# Patient Record
Sex: MALE | Race: WHITE | Employment: OTHER | URBAN - METROPOLITAN AREA
[De-identification: names, ages, dates, MRNs, and addresses within clinical notes are randomized per-mention and may not be internally consistent; named-entity substitution may affect disease eponyms.]

---

## 2017-08-15 ENCOUNTER — OFFICE VISIT (OUTPATIENT)
Dept: HEMATOLOGY | Age: 77
End: 2017-08-15

## 2017-08-15 ENCOUNTER — HOSPITAL ENCOUNTER (OUTPATIENT)
Dept: LAB | Age: 77
Discharge: HOME | End: 2017-08-15
Payer: MEDICARE

## 2017-08-15 VITALS
WEIGHT: 191 LBS | DIASTOLIC BLOOD PRESSURE: 68 MMHG | SYSTOLIC BLOOD PRESSURE: 126 MMHG | BODY MASS INDEX: 27.35 KG/M2 | OXYGEN SATURATION: 97 % | HEART RATE: 80 BPM | RESPIRATION RATE: 16 BRPM | HEIGHT: 70 IN | TEMPERATURE: 95.7 F

## 2017-08-15 DIAGNOSIS — C22.0 HEPATOCELLULAR CARCINOMA (HCC): Primary | ICD-10-CM

## 2017-08-15 DIAGNOSIS — C22.0 HEPATOCELLULAR CARCINOMA (HCC): ICD-10-CM

## 2017-08-15 PROBLEM — K74.60 CIRRHOSIS OF LIVER WITHOUT ASCITES (HCC): Status: ACTIVE | Noted: 2017-08-15

## 2017-08-15 PROBLEM — J45.909 ASTHMA: Status: ACTIVE | Noted: 2017-08-15

## 2017-08-15 PROBLEM — I10 HYPERTENSION: Status: ACTIVE | Noted: 2017-08-15

## 2017-08-15 PROBLEM — N40.0 BPH (BENIGN PROSTATIC HYPERTROPHY): Status: ACTIVE | Noted: 2017-08-15

## 2017-08-15 LAB
ALBUMIN SERPL BCP-MCNC: 3.6 G/DL (ref 3.4–5)
ALBUMIN/GLOB SERPL: 1.2 {RATIO} (ref 0.8–1.7)
ALP SERPL-CCNC: 52 U/L (ref 45–117)
ALT SERPL-CCNC: 23 U/L (ref 16–61)
ANION GAP BLD CALC-SCNC: 8 MMOL/L (ref 3–18)
AST SERPL W P-5'-P-CCNC: 19 U/L (ref 15–37)
BASOPHILS # BLD: 0 K/UL (ref 0–0.06)
BASOPHILS NFR BLD: 0 % (ref 0–2)
BILIRUB DIRECT SERPL-MCNC: 0.1 MG/DL (ref 0–0.2)
BILIRUB SERPL-MCNC: 0.5 MG/DL (ref 0.2–1)
BUN SERPL-MCNC: 19 MG/DL (ref 7–18)
BUN/CREAT SERPL: 20 (ref 12–20)
CALCIUM SERPL-MCNC: 8.8 MG/DL (ref 8.5–10.1)
CHLORIDE SERPL-SCNC: 110 MMOL/L (ref 100–108)
CO2 SERPL-SCNC: 30 MMOL/L (ref 21–32)
CREAT SERPL-MCNC: 0.95 MG/DL (ref 0.6–1.3)
DIFFERENTIAL METHOD BLD: ABNORMAL
EOSINOPHIL # BLD: 0.1 K/UL (ref 0–0.4)
EOSINOPHIL NFR BLD: 2 % (ref 0–5)
ERYTHROCYTE [DISTWIDTH] IN BLOOD BY AUTOMATED COUNT: 13.4 % (ref 11.6–14.5)
FERRITIN SERPL-MCNC: 64 NG/ML (ref 8–388)
GLOBULIN SER CALC-MCNC: 2.9 G/DL (ref 2–4)
GLUCOSE SERPL-MCNC: 81 MG/DL (ref 74–99)
HCT VFR BLD AUTO: 42.1 % (ref 36–48)
HGB BLD-MCNC: 13.6 G/DL (ref 13–16)
IRON SATN MFR SERPL: 47 %
IRON SERPL-MCNC: 96 UG/DL (ref 50–175)
LYMPHOCYTES # BLD: 1.1 K/UL (ref 0.9–3.6)
LYMPHOCYTES NFR BLD: 20 % (ref 21–52)
MCH RBC QN AUTO: 31.6 PG (ref 24–34)
MCHC RBC AUTO-ENTMCNC: 32.3 G/DL (ref 31–37)
MCV RBC AUTO: 97.7 FL (ref 74–97)
MONOCYTES # BLD: 0.4 K/UL (ref 0.05–1.2)
MONOCYTES NFR BLD: 7 % (ref 3–10)
NEUTS SEG # BLD: 4 K/UL (ref 1.8–8)
NEUTS SEG NFR BLD: 71 % (ref 40–73)
PLATELET # BLD AUTO: 175 K/UL (ref 135–420)
PMV BLD AUTO: 10.4 FL (ref 9.2–11.8)
POTASSIUM SERPL-SCNC: 4.4 MMOL/L (ref 3.5–5.5)
PROT SERPL-MCNC: 6.5 G/DL (ref 6.4–8.2)
RBC # BLD AUTO: 4.31 M/UL (ref 4.7–5.5)
SODIUM SERPL-SCNC: 148 MMOL/L (ref 136–145)
TIBC SERPL-MCNC: 204 UG/DL (ref 250–450)
WBC # BLD AUTO: 5.7 K/UL (ref 4.6–13.2)

## 2017-08-15 PROCEDURE — 83540 ASSAY OF IRON: CPT | Performed by: INTERNAL MEDICINE

## 2017-08-15 PROCEDURE — 82103 ALPHA-1-ANTITRYPSIN TOTAL: CPT | Performed by: INTERNAL MEDICINE

## 2017-08-15 PROCEDURE — 85025 COMPLETE CBC W/AUTO DIFF WBC: CPT | Performed by: INTERNAL MEDICINE

## 2017-08-15 PROCEDURE — 82728 ASSAY OF FERRITIN: CPT | Performed by: INTERNAL MEDICINE

## 2017-08-15 PROCEDURE — 36415 COLL VENOUS BLD VENIPUNCTURE: CPT | Performed by: INTERNAL MEDICINE

## 2017-08-15 PROCEDURE — 80048 BASIC METABOLIC PNL TOTAL CA: CPT | Performed by: INTERNAL MEDICINE

## 2017-08-15 PROCEDURE — 80076 HEPATIC FUNCTION PANEL: CPT | Performed by: INTERNAL MEDICINE

## 2017-08-15 NOTE — MR AVS SNAPSHOT
Visit Information Date & Time Provider Department Dept. Phone Encounter #  
 8/15/2017  9:00 AM Robe Benjamin MD [facility] [address] of  [address] [id number] Follow-up Instructions Return in about 4 weeks (around 9/12/2017) for MLS. Upcoming Health Maintenance Date Due DTaP/Tdap/Td series (1 - Tdap) 1/23/1964 FOBT Q 1 YEAR AGE 50-75 1/23/1993 ZOSTER VACCINE AGE 60> 11/23/2002 GLAUCOMA SCREENING Q2Y 1/23/2008 Pneumococcal 65+ Low/Medium Risk (1 of 2 - PCV13) 1/23/2008 MEDICARE YEARLY EXAM 1/23/2008 INFLUENZA AGE 9 TO ADULT 8/1/2017 Allergies as of 8/15/2017  Review Complete On: 8/15/2017 By: Robe Benjamin MD  
  
 Severity Noted Reaction Type Reactions Celebrex [Celecoxib]  08/15/2017    Rash Current Immunizations  Never Reviewed No immunizations on file. Not reviewed this visit You Were Diagnosed With   
  
 Codes Comments Hepatocellular carcinoma ([facility]    -  Primary ICD-10-CM: C22.0 ICD-9-CM: 155.0 Vitals BP Pulse Temp Resp Height(growth percentile) 126/68 (BP 1 Location: Left arm, BP Patient Position: Sitting) 80 95.7 °F (35.4 °C) (Tympanic) 16 5' 10\" (1.778 m) Weight(growth percentile) SpO2 BMI Smoking Status 191 lb (86.6 kg) 97% 27.41 kg/m2 Former Smoker Vitals History BMI and BSA Data Body Mass Index Body Surface Area  
 27.41 kg/m 2 2.07 m 2 Your Updated Medication List  
  
   
This list is accurate as of: 8/15/17 10:29 AM.  Always use your most recent med list.  
  
  
  
  
 Lindalou Bellow HFA IN Take  by inhalation. CARDURA PO Take  by mouth. GLUCOSAMINE COMPLEX PO Take  by mouth. MEN'S MULTI-VITAMIN PO Take  by mouth. REMERON PO Take  by mouth. XANAX PO Take  by mouth. XARELTO PO Take  by mouth. Follow-up Instructions Return in about 4 weeks (around 9/12/2017) for MLS. To-Do List   
 08/15/2017 Imaging:  US ABD LTD W ELASTOGRAPHY Introducing [facility]! [employer] introduces [email or web address] patient portal. Now you can access parts of your medical record, email your doctor's office, and request medication refills online. 1. In your internet browser, go to [email or web address] 2. Click on the First Time User? Click Here link in the Sign In box. You will see the New Member Sign Up page. 3. Enter your [email or web address] Access Code exactly as it appears below. You will not need to use this code after youve completed the sign-up process. If you do not sign up before the expiration date, you must request a new code. · [email or web address] Access Code: [email or web address] Expires: 11/8/2017  4:17 PM 
 
4. Enter the last four digits of your Social Security Number (xxxx) and Date of Birth (mm/dd/yyyy) as indicated and click Submit. You will be taken to the next sign-up page. 5. Create a [email or web address] ID. This will be your [email or web address] login ID and cannot be changed, so think of one that is secure and easy to remember. 6. Create a [email or web address] password. You can change your password at any time. 7. Enter your Password Reset Question and Answer. This can be used at a later time if you forget your password. 8. Enter your e-mail address. You will receive e-mail notification when new information is available in [address] 9. Click Sign Up. You can now view and download portions of your medical record. 10. Click the Download Summary menu link to download a portable copy of your medical information. If you have questions, please visit the Frequently Asked Questions section of the [email or web address] website. Remember, [email or web address] is NOT to be used for urgent needs. For medical emergencies, dial 911. Now available from your iPhone and Android! Please provide this summary of care documentation to your next provider. Your primary care clinician is listed as Wen Ortega.  If you have any questions after today's visit, please call [phone].

## 2017-08-15 NOTE — PROGRESS NOTES
[address] [place], [address], [place], [place]       POLLY Corrales PA-C Nino Kudo, NP Roselynn Lawn, NP        at [address]     [address] [place] [address], [address]     [phone]     FAX: [phone]    at [address], [address] [place] [address], [address]     [phone]     FAX: [phone]       PCP: Patient Care Team:  Venkatesh Cordero MD as PCP - General (Internal Medicine)  Sorin Rahman MD (Oncology)      Problem List  Date Reviewed: 8/15/2017          Codes Class Noted    Hepatocellular carcinoma ([address] ICD-10-CM: C22.0  ICD-9-CM: 155.0  8/15/2017        Cirrhosis of liver without ascites ([address] ICD-10-CM: K74.60  ICD-9-CM: 571.5  8/15/2017        Asthma ICD-10-CM: J45.909  ICD-9-CM: 493.90  8/15/2017        BPH (benign prostatic hypertrophy) ICD-10-CM: N40.0  ICD-9-CM: 600.00  8/15/2017        Hypertension ICD-10-CM: I10  ICD-9-CM: 401.9  8/15/2017                The physicians listed above have asked me to see [facility] in consultation regarding hepatocellular carcinoma and its management. All medical records sent by the referring physicians were reviewed including imaging studies and pathology. The patient is a 78y.o. year old  male without evidence of cirrhosis who was found to have a liver mass in 2013 that was suspicious for [address] Serologic studies for HBV and HCV were negative,.  IT is not clear if serologic studies for other causes of liver disease were done at that time. He underwent a partial right lobectomy by Dr Malik Frost at [facility] in 2013. He developed recurrence of [address] in 2015. He was seen at [facility] and underwent hepatic resection to remove 2 HCCs in 7/2015 and 8/2015. The location of the lesions are not know at this time. He developed another recurrence of [address] in 2016. He was enrolled in a clinival trial using Opdivo and another experimental agent at [facility]. He eventual failed that regimen and it was stopped when the lesion in the left lobe was noted to increase in size. He was seen at [facility] and underwent [address] 2 lesions in 2/2017. Either both were in the left lobe or one was in the left and the other in the right. MRI scan in 5/2017 did not demonstrate any recurrence or new enhancing hepatic lesions. He is scheduled for another MRI in 8/2017. The most recent imaging of the liver did suggest that the patient had cirrhosis. He notes that he was never previously been told he had cirrhosis and wants this clarified. The patient has not developed any complications of cirrhosis. The patient has not had been evaluated for varices. The most recent laboratory studies indicate that the liver transaminases are normal, ALP is normal, tests of hepatic synthetic and metabolic function are normal, and the platelet count is normal.    AFP has always been in the normal range. The patient has no symptoms which could be attributed to the liver disorder. The patient completes all daily activities without any functional limitations. The patient has not experienced fatigue, pain in the right side over the liver,       ALLERGIES  Allergies   Allergen Reactions    Celebrex [Celecoxib] Rash       MEDICATIONS  Current Outpatient Prescriptions   Medication Sig    FLUTICASONE/SALMETEROL (ADVAIR HFA IN) Take  by inhalation.  DOXAZOSIN MESYLATE (CARDURA PO) Take  by mouth.  ALPRAZOLAM (XANAX PO) Take  by mouth.  MIRTAZAPINE (REMERON PO) Take  by mouth.  GLUCOSAM/GLUC RINALDI/VN-TIM-I-GLUC (GLUCOSAMINE COMPLEX PO) Take  by mouth.  MEN'S MULTI-VITAMIN PO Take  by mouth.  RIVAROXABAN (XARELTO PO) Take  by mouth. No current facility-administered medications for this visit.         SYSTEM REVIEW NOT RELATED TO LIVER DISEASE OR REVIEWED ABOVE:  Constitution systems: Negative for fever, chills, weight gain, weight loss. Eyes: Negative for visual changes. ENT: Negative for sore throat, painful swallowing. Respiratory: Negative for cough, hemoptysis, SOB. Cardiology: Negative for chest pain, palpitations. GI:  Negative for constipation or diarrhea. : Negative for urinary frequency, dysuria, hematuria, nocturia. Skin: Negative for rash. Hematology: Negative for easy bruising, blood clots. Musculo-skelatal: Negative for back pain, muscle pain, weakness. Neurologic: Negative for headaches, dizziness, vertigo, memory problems not related to HE. Psychology: Negative for anxiety, depression. FAMILY HISTORY:  There is no family history of liver disease. SOCIAL HISTORY:  The patient is . The patient does not use tobacco products. The patient has previously consumed alcohol socially never in excess. The patient had exposure to agent orange and to radiation in the 1960-1970s. PHYSICAL EXAMINATION:  Visit Vitals    /68 (BP 1 Location: Left arm, BP Patient Position: Sitting)    Pulse 80    Temp 95.7 °F (35.4 °C) (Tympanic)    Resp 16    Ht 5' 10\" (1.778 m)    Wt 191 lb (86.6 kg)    SpO2 97%    BMI 27.41 kg/m2     General: No acute distress. Eyes: Sclera anicteric. ENT: No oral lesions. Thyroid normal.  Nodes: No adenopathy. Skin: No spider angiomata. No jaundice. No palmar erythema. Respiratory: Lungs clear to auscultation. Cardiovascular: Regular heart rate. No murmurs. No JVD. Abdomen: Well healed liver incision. Soft non-tender. Liver size normal to percussion/palpation. Spleen not palpable. No obvious ascites. Extremities: No edema. No muscle wasting. No gross arthritic changes. Neurologic: Alert and oriented. Cranial nerves grossly intact. No asterixis.     LABORATORY STUDIES:  [facility] of [address] [place] [address] & Units 8/15/2017   WBC 4.6 - 13.2 K/uL 5. 7   ANC 1.8 - 8.0 K/UL 4.0   HGB 13.0 - 16.0 g/dL 13.6    - 420 K/uL 175   AST 15 - 37 U/L 19   ALT 16 - 61 U/L 23   Alk Phos 45 - 117 U/L 52   Bili, Total 0.2 - 1.0 MG/DL 0.5   Bili, Direct 0.0 - 0.2 MG/DL 0.1   Albumin 3.4 - 5.0 g/dL 3.6   BUN 7.0 - 18 MG/DL 19 (H)   Creat 0.6 - 1.3 MG/DL 0.95   Na 136 - 145 mmol/L 148 (H)   K 3.5 - 5.5 mmol/L 4.4   Cl 100 - 108 mmol/L 110 (H)   CO2 21 - 32 mmol/L 30   Glucose 74 - 99 mg/dL 81     SEROLOGIES:  Not available or performed. Testing will be performed as indicated. LIVER HISTOLOGY:  Not available or performed    ENDOSCOPIC PROCEDURES:  Not available or performed    RADIOLOGY:  6/2015. CT scan chest, abdomen, pelvis. No pulmonary lesions. Several soft tissue enhancing lesions inferior to left lobe. New soft tissue mass adjacent to surgical incision. 12/2015. MRI liver. No enhancing lesions concerning for recurrent HCC.  6/2016. MRI liver. Increasing size of soft tissue lesions adjacent to inferior surface of left lobe. 5 mm left lobe enhancing lesions with washout. 9/2016. CT chest, abdomen. New 3 mm pulmonary nodule. No enhancing liver lesions. 12/2016. CT chest, abdomen. No new or enlarging pulmonary lesions. Increase in size of segment 2 enhancing lesion with washout. New 6mm enhancing lesion in segment 3. [address] TREATMENT:  2013. Partial right hepatectomy. 2015. Resection of soft tissue lesions adjacent to liver and in surgical incision. 2/2017. MWA of lesions in segment 2 and 3. OTHER TESTING:  Not available or performed    ASSESSMENT AND PLAN:  Hepatocellular carcinoma stage 3 at the time of diagnosis. Surgical resection of primary lesion in 2013. Surgical resection of [address] outside the lvier and in the surgical scar in 2015. MWA of lesions in segments 2 and 3 in 2/2017. Will order hepatic panel, BMP, INR, CBC with platelet count to assess liver function and recalculate MELD score.     The most recent laboratory studies indicate that the liver transaminases are normal, alkaline phosphatase is normal, tests of hepatic synthetic and metabolic function are normal, and the platelet count is normal.      Recent imaging of the liver suggesting cirrhosis. I do not think he has cirrhosis. I think the liver appears nodular and irregular in its margins becasue of 2 previous surgical resections. If the patient had cirrhosis at the time of the original presentation I do not think he would have tolerated the first right hepatectomy and definitely not tolerated a second liver surgery. Will perform imaging of the liver with Ultrasound. The need to assess liver fibrosis was discussed. Sheer wave elastography can assess liver fibrosis and determine if a patient has advanced fibrosis or cirrhosis without the need for liver biopsy. Sheer wave elastography is now available at [facility]. This will be scheduled. Serologic testing for causes of chronic liver disease were ordered. At this point I do not suspect her has any underlying liver disease. There is no indication to perform EGD and evaluate for Esophageal varices unless the elastography suggests he has significant fibrosis/cirrhosis. All of the above issues were discussed with the patient. All questions were answered. The patient expressed a clear understanding of the above. [facility] [place] [address] in 4 weeks to review data and development a treatment plan.     Melody Jackson MD  Liver [place] [facility] [address], [address] [place] [address], [address] [place] [address] Street [address]  [phone]

## 2017-08-16 LAB — A1AT SERPL-MCNC: 168 MG/DL (ref 90–200)

## 2017-09-18 ENCOUNTER — HOSPITAL ENCOUNTER (OUTPATIENT)
Dept: ULTRASOUND IMAGING | Age: 77
Discharge: HOME | End: 2017-09-18
Attending: INTERNAL MEDICINE
Payer: MEDICARE

## 2017-09-18 DIAGNOSIS — C22.0 HEPATOCELLULAR CARCINOMA (HCC): ICD-10-CM

## 2017-09-18 PROCEDURE — 0346T US ABD LTD W ELASTOGRAPHY: CPT

## 2017-09-21 ENCOUNTER — TELEPHONE (OUTPATIENT)
Dept: HEMATOLOGY | Age: 77
End: 2017-09-21

## 2017-11-01 ENCOUNTER — TELEPHONE (OUTPATIENT)
Dept: HEMATOLOGY | Age: 77
End: 2017-11-01

## 2021-08-30 ENCOUNTER — OFFICE VISIT (OUTPATIENT)
Dept: HEMATOLOGY | Age: 81
End: 2021-08-30
Payer: MEDICARE

## 2021-08-30 VITALS
BODY MASS INDEX: 25.62 KG/M2 | OXYGEN SATURATION: 98 % | HEIGHT: 70 IN | DIASTOLIC BLOOD PRESSURE: 67 MMHG | HEART RATE: 80 BPM | TEMPERATURE: 97.8 F | WEIGHT: 179 LBS | SYSTOLIC BLOOD PRESSURE: 139 MMHG

## 2021-08-30 DIAGNOSIS — C22.0 HEPATOCELLULAR CARCINOMA (HCC): Primary | ICD-10-CM

## 2021-08-30 PROBLEM — D64.9 ANEMIA: Status: ACTIVE | Noted: 2021-08-30

## 2021-08-30 PROBLEM — D69.6 THROMBOCYTOPENIA (HCC): Status: ACTIVE | Noted: 2021-08-30

## 2021-08-30 PROBLEM — Z77.098 AGENT ORANGE EXPOSURE: Status: ACTIVE | Noted: 2021-08-30

## 2021-08-30 PROBLEM — K74.60 CIRRHOSIS OF LIVER WITHOUT ASCITES (HCC): Status: RESOLVED | Noted: 2017-08-15 | Resolved: 2021-08-30

## 2021-08-30 PROCEDURE — G0463 HOSPITAL OUTPT CLINIC VISIT: HCPCS | Performed by: INTERNAL MEDICINE

## 2021-08-30 PROCEDURE — 1101F PT FALLS ASSESS-DOCD LE1/YR: CPT | Performed by: INTERNAL MEDICINE

## 2021-08-30 PROCEDURE — G8754 DIAS BP LESS 90: HCPCS | Performed by: INTERNAL MEDICINE

## 2021-08-30 PROCEDURE — G8536 NO DOC ELDER MAL SCRN: HCPCS | Performed by: INTERNAL MEDICINE

## 2021-08-30 PROCEDURE — 99205 OFFICE O/P NEW HI 60 MIN: CPT | Performed by: INTERNAL MEDICINE

## 2021-08-30 PROCEDURE — G8427 DOCREV CUR MEDS BY ELIG CLIN: HCPCS | Performed by: INTERNAL MEDICINE

## 2021-08-30 PROCEDURE — G8752 SYS BP LESS 140: HCPCS | Performed by: INTERNAL MEDICINE

## 2021-08-30 PROCEDURE — G8419 CALC BMI OUT NRM PARAM NOF/U: HCPCS | Performed by: INTERNAL MEDICINE

## 2021-08-30 PROCEDURE — G8510 SCR DEP NEG, NO PLAN REQD: HCPCS | Performed by: INTERNAL MEDICINE

## 2021-08-30 RX ORDER — OMEPRAZOLE 20 MG/1
20 CAPSULE, DELAYED RELEASE ORAL DAILY
COMMUNITY

## 2021-08-30 RX ORDER — SUCRALFATE 1 G/1
1 TABLET ORAL 4 TIMES DAILY
COMMUNITY

## 2021-08-30 NOTE — PROGRESS NOTES
[address] [facility]      Ro Hannon MD, John Rodriguez, Robert Rivera MD, MPH      Mejia Fitzgerald, NATALIA Beard, [facility]     Lety Goyal, [facility]   Lori Boyd P-DELMI Maria, [facility]       Frederic Bruno [facility]    at [address], [address] [place] [address]    [address], located within [facility] 22.    [phone]    FAX: [address] [place] [address], [address]    [phone]    FAX: [phone]       Patient Care Team:  Masha Lora MD as PCP - General (Internal Medicine)  Izabel Lindquist MD (Oncology)  Izabel Lindquist MD as Referring Provider (Oncology)      Problem List  Date Reviewed: 8/30/2021        Codes Class Noted    Pulmonary embolism [facility]) ICD-10-CM: I26.99  ICD-9-CM: 415.19  9/12/2021        Anemia ICD-10-CM: D64.9  ICD-9-CM: 285.9  8/30/2021        Thrombocytopenia ([facility] ICD-10-CM: D69.6  ICD-9-CM: 287.5  8/30/2021        Agent orange exposure ICD-10-CM: A70.470  ICD-9-CM: V87.2  8/30/2021        Hepatocellular carcinoma ([facility] ICD-10-CM: C22.0  ICD-9-CM: 155.0  8/15/2017        Asthma ICD-10-CM: J45.909  ICD-9-CM: 493.90  8/15/2017        BPH (benign prostatic hypertrophy) ICD-10-CM: N40.0  ICD-9-CM: 600.00  8/15/2017        Hypertension ICD-10-CM: I10  ICD-9-CM: 401.9  8/15/2017              The clinicians listed above have asked me to see Paz Quigley in consultation regarding hepatocellular carcinoma and its management. All medical records sent by the referring physicians were reviewed including imaging studies and pathology. The patient is a 80y.o. year old  male without evidence of cirrhosis. He had a pulmonary embolus in 9/2013 and imaging demonstrated a liver mass.       He was found to have hepatocellular carcinoma in 11/2013. A needle biopsy of the liver mass in 11/2013 was positive for [address]    HCC was treated with TACE in 12/2013. He went to [facility] and underwent cholecystectomy and extended right hepatectomy including segments 5,6,7,8,1 and 4 in 1/2014. Pathology of the 5.1 cm mass was well differentiated HCC. He had local recurrence of [address] in 8/2015. He entered a clinical trial at [facility] and was treated with Nivolumab (Opdivo) another agent, probably Ipilimumab (Harshad Sunray) in 8/2015. Unclear when clinical trial stopped but I suspect this continued until 12/2016 - 1/2017. He underwent Radiotherapy, SBRT or SRS starting at [facility] in 2/2017. The most recent imaging of the liver was MRI performed in 7/2021. Results demonstrate previous right hepatectomy. Slight decrease in size of arterial enhancing lesion in left lobe now measuring 1.4 cm. Slight decrease in size of hyperenhancing focus at resection margin, now measuring 1.0 cm. Anterior left lobe lesion measuring 2.7 cm has no enhancement and likely not HCC. Several liver cysts. The patient has the following symptoms which could be attributed to the liver disorder:    fatigue,     The patient is not experiencing the following symptoms which are commonly seen in this liver disorder:   nausea,   vomiting,   swelling of the abdomen,   swelling of the lower extremities,     The patient completes all daily activities without any functional limitations. ASSESSMENT AND PLAN:  Hepatocellular carcinoma   The diagnosis was made in 11/2013 by biopsy. At diagnosis the [address] was of unclear size. Location appeared to be right lobe. The patient does not have cirrhosis    HCC has been treated with TACE, surgical resection, immune therapy and RFA and SRS. He has never been treated with a TKI. The most recent imaging was a Dynamic MRI performed in 7/2021.   This demonstrated 2 small areas of residual enhancement in left lobe measuring 1.4 cm and at resection margin in right lobe measuring 1.0 cm. Will schedule for repeat dynamic MRI in 10/2021     Will perform fibroscan to assess for liver injury at the next appointment in 1 month. Given the previous liver resection may not be able to get a reading. If this occurrs will do US elastography. Pulmonary Embolus  This occurred in 9/2013. He is on anticoagulation with Roma Adry. Anemia   This was due to FE deficiency. He was treated with IV FE. Anemia has resolved. The most recent HB is 13.6 gm. Treatment of other medical problems   There are no contraindications for the patient to take most medications that are necessary for treatment of other medical issues. Counseling for alcohol in patients with chronic liver disease  The patient was counseled regarding alcohol consumption and the effect of alcohol on chronic liver disease. The patient has not consumed alcohol since 11/2013. Vaccinations   The patient has received 2 doses of COVID-19 vaccine. Routine vaccinations against other bacterial and viral agents can be performed as indicated. Annual flu vaccination should be administered if indicated. ALLERGIES  Allergies   Allergen Reactions    Celebrex [Celecoxib] Rash       MEDICATIONS  Current Outpatient Medications   Medication Sig    sucralfate (Carafate) 1 gram tablet Take 1 g by mouth four (4) times daily.  omeprazole (PRILOSEC) 20 mg capsule Take 20 mg by mouth daily.  FLUTICASONE/SALMETEROL (ADVAIR HFA IN) Take  by inhalation.  DOXAZOSIN MESYLATE (CARDURA PO) Take  by mouth.  ALPRAZOLAM (XANAX PO) Take  by mouth.  MIRTAZAPINE (REMERON PO) Take  by mouth.  MEN'S MULTI-VITAMIN PO Take  by mouth.  RIVAROXABAN (XARELTO PO) Take  by mouth.  GLUCOSAM/GLUC RINALDI/RD-PVZ-I-GLUC (GLUCOSAMINE COMPLEX PO) Take  by mouth. (Patient not taking: Reported on 8/30/2021)     No current facility-administered medications for this visit.        SYSTEM REVIEW NOT RELATED TO LIVER DISEASE OR REVIEWED ABOVE:  Constitution systems: Negative for fever, chills, weight gain, weight loss. Eyes: Negative for visual changes. ENT: Negative for sore throat, painful swallowing. Respiratory: Negative for cough, hemoptysis, SOB. Cardiology: Negative for chest pain, palpitations. GI:  Negative for constipation or diarrhea. : Negative for urinary frequency, dysuria, hematuria, nocturia. Skin: Negative for rash. Hematology: Negative for easy bruising, blood clots. Musculo-skelatal: Negative for back pain, muscle pain, weakness. Neurologic: Negative for headaches, dizziness, vertigo, memory problems not related to HE. Psychology: Negative for anxiety, depression. FAMILY HISTORY:  The father  of lung cancer. The mother  of mesothelioma. There is no family history of liver disease. SOCIAL HISTORY:  The patient is . The patient has 2 children, and 4 grandchildren. The patient stopped using tobacco products in . The patient consumes 2 scotch per night. The patient has been abstinent from alcohol since 2013. The patient retired as a [employer]. PHYSICAL EXAMINATION:  Visit Vitals  /67   Pulse 80   Temp 97.8 °F (36.6 °C) (Tympanic)   Ht 5' 10\" (1.778 m)   Wt 179 lb (81.2 kg)   SpO2 98%   BMI 25.68 kg/m²       General: No acute distress. Eyes: Sclera anicteric. ENT: No oral lesions. Thyroid normal.  Nodes: No adenopathy. Skin: No spider angiomata. No jaundice. No palmar erythema. Respiratory: Lungs clear to auscultation. Cardiovascular: Regular heart rate. No murmurs. No JVD. Abdomen: Soft non-tender, liver size normal to percussion/palpation. Spleen not palpable. No obvious ascites. Extremities: No edema. No muscle wasting. No gross arthritic changes. Neurologic: Alert and oriented. Cranial nerves grossly intact. No asterixis.       PHYSICAL EXAMINATION:  Visit Vitals  /67   Pulse 80   Temp 97.8 °F (36.6 °C) (Tympanic)   Ht 5' 10\" (1.778 m)   Wt 179 lb (81.2 kg)   SpO2 98%   BMI 25.68 kg/m²     General: No acute distress. Eyes: Sclera anicteric. ENT: No oral lesions. Thyroid normal.  Nodes: No adenopathy. Skin: No spider angiomata. No jaundice. No palmar erythema. Respiratory: Lungs clear to auscultation. Cardiovascular: Regular heart rate. No murmurs. No JVD. Abdomen: Well healed surgical scars. Extremities: No edema. No muscle wasting. No gross arthritic changes. Neurologic: Alert and oriented. Cranial nerves grossly intact. No asterixis. LABORATORY STUDIES:  Liver [place] of [address] [place] [address] 8/15/2017   WBC 4.6 - 13.2 K/uL 5.7   ANC 1.8 - 8.0 K/UL 4.0   HGB 13.0 - 16.0 g/dL 13.6    - 420 K/uL 175   AST 15 - 37 U/L 19   ALT 16 - 61 U/L 23   Alk Phos 45 - 117 U/L 52   Bili, Total 0.2 - 1.0 MG/DL 0.5   Bili, Direct 0.0 - 0.2 MG/DL 0.1   Albumin 3.4 - 5.0 g/dL 3.6   BUN 7.0 - 18 MG/DL 19 (H)   Creat 0.6 - 1.3 MG/DL 0.95   Na 136 - 145 mmol/L 148 (H)   K 3.5 - 5.5 mmol/L 4.4   Cl 100 - 108 mmol/L 110 (H)   CO2 21 - 32 mmol/L 30   Glucose 74 - 99 mg/dL 81     SEROLOGIES:  7/2016. HAV total negative, HBsAntigen negative, anti-HBcore negative, anti-HBsurface negative, anti-HCV negative, HCV RNA undetectable. Serologies Latest Ref Rng & Units 8/15/2017   Ferritin 8 - 388 NG/ML 64   Iron % Saturation % 47   Alpha-1 antitrypsin level 90 - 200 mg/dL 168     LIVER HISTOLOGY:  Not available or performed    ENDOSCOPIC PROCEDURES:  Not available or performed    RADIOLOGY:  See above    OTHER TESTING:  Not available or performed    FOLLOW-UP:  All of the issues listed above in the Assessment and Plan were discussed with the patient. All questions were answered. The patient expressed a clear understanding of the above. [facility] 87 in 4 weeks for Fibroscan to review all data and determine the treatment plan.       Ivania Padilla MD  [facility] [address] [facility]  [address] [facility] [address], [address]  [address], [address]  12 [facility]

## 2021-08-30 NOTE — Clinical Note
9/12/2021    Patient: Tete Bingham   YOB: 1940   Date of Visit: 8/30/2021     Gurdeep Uribe MD  [address] [address]  Via Fax: [phone]    Dear Gurdeep Uribe MD,      Thank you for referring Mr. Tete Bingham to [address] [place] [address],[address] for evaluation. My notes for this consultation are attached. If you have questions, please do not hesitate to call me. I look forward to following your patient along with you.       Sincerely,    Lavinia Woods MD

## 2021-09-12 PROBLEM — I26.99 PULMONARY EMBOLISM (HCC): Status: ACTIVE | Noted: 2021-09-12

## 2021-10-18 ENCOUNTER — OFFICE VISIT (OUTPATIENT)
Dept: HEMATOLOGY | Age: 81
End: 2021-10-18
Payer: MEDICARE

## 2021-10-18 VITALS
SYSTOLIC BLOOD PRESSURE: 143 MMHG | HEART RATE: 70 BPM | BODY MASS INDEX: 27.32 KG/M2 | WEIGHT: 190.38 LBS | OXYGEN SATURATION: 98 % | TEMPERATURE: 96.3 F | DIASTOLIC BLOOD PRESSURE: 71 MMHG

## 2021-10-18 DIAGNOSIS — C22.0 HEPATOCELLULAR CARCINOMA (HCC): Primary | ICD-10-CM

## 2021-10-18 PROCEDURE — G0463 HOSPITAL OUTPT CLINIC VISIT: HCPCS | Performed by: INTERNAL MEDICINE

## 2021-10-18 PROCEDURE — G8419 CALC BMI OUT NRM PARAM NOF/U: HCPCS | Performed by: INTERNAL MEDICINE

## 2021-10-18 PROCEDURE — 1101F PT FALLS ASSESS-DOCD LE1/YR: CPT | Performed by: INTERNAL MEDICINE

## 2021-10-18 PROCEDURE — G8536 NO DOC ELDER MAL SCRN: HCPCS | Performed by: INTERNAL MEDICINE

## 2021-10-18 PROCEDURE — G8754 DIAS BP LESS 90: HCPCS | Performed by: INTERNAL MEDICINE

## 2021-10-18 PROCEDURE — G8432 DEP SCR NOT DOC, RNG: HCPCS | Performed by: INTERNAL MEDICINE

## 2021-10-18 PROCEDURE — 91200 LIVER ELASTOGRAPHY: CPT | Performed by: INTERNAL MEDICINE

## 2021-10-18 PROCEDURE — G8753 SYS BP > OR = 140: HCPCS | Performed by: INTERNAL MEDICINE

## 2021-10-18 PROCEDURE — 99214 OFFICE O/P EST MOD 30 MIN: CPT | Performed by: INTERNAL MEDICINE

## 2021-10-18 PROCEDURE — G8427 DOCREV CUR MEDS BY ELIG CLIN: HCPCS | Performed by: INTERNAL MEDICINE

## 2021-10-18 NOTE — Clinical Note
10/30/2021    Patient: Gladys Vazquez   YOB: 1940   Date of Visit: 10/18/2021     Jaspreet Ramirez MD  [facility] [address] [place] [id number]  Via Fax: [phone]    Dear Jaspreet Ramirez MD,      Thank you for referring Mr. Gladys Vazquez to [address] [place] [address],[address] for evaluation. My notes for this consultation are attached. If you have questions, please do not hesitate to call me. I look forward to following your patient along with you.       Sincerely,    Edith Peabody, MD

## 2021-10-18 NOTE — PROGRESS NOTES
[address] [facility]      Trevor Anaya MD, Konrad Kang MD, MPH      Brit Evangelista, PA-DELMI Morales, ACNP-BC     Lety Goyal, [facility]   Pedro Vasquez, FNP-C    Shireen García, [facility]       Frederic Bruno [address]    at [address], [address] [place] [address]    [address] [facility] 22.    [phone]    FAX: [address] [place] [address], [address]    [phone]    [address] [place] [address] Street: [phone]       Patient Care Team:  Guillermo Hamm MD as PCP - General (Internal Medicine)  Laura Sams MD (Oncology)  Laura Sams MD as Referring Provider (Oncology)      Problem List  Date Reviewed: 8/30/2021        Codes Class Noted    Pulmonary embolism [facility]) ICD-10-CM: I26.99  ICD-9-CM: 415.19  9/12/2021        Anemia ICD-10-CM: D64.9  ICD-9-CM: 285.9  8/30/2021        Thrombocytopenia ([facility].) ICD-10-CM: D69.6  ICD-9-CM: 287.5  8/30/2021        Agent orange exposure ICD-10-CM: R97.115  ICD-9-CM: V87.2  8/30/2021        Hepatocellular carcinoma ([facility] ICD-10-CM: C22.0  ICD-9-CM: 155.0  8/15/2017        Asthma ICD-10-CM: J45.909  ICD-9-CM: 493.90  8/15/2017        BPH (benign prostatic hypertrophy) ICD-10-CM: N40.0  ICD-9-CM: 600.00  8/15/2017        Hypertension ICD-10-CM: I10  ICD-9-CM: 401.9  8/15/2017              Young Chinchilla is being seen at [facility] for management of hepatocellular carcinoma. The active problem list, all pertinent past medical history, medications, radiologic findings and laboratory findings related to the liver disorder were reviewed and discussed with the patient. The patient is a 80y.o. year old  male without evidence of cirrhosis.       He had a pulmonary embolus in 9/2013 and imaging demonstrated a liver mass.      He was found to have hepatocellular carcinoma in 11/2013. A needle biopsy of the liver mass in 11/2013 was positive for [address]    HCC was treated with TACE in 12/2013. He went to [facility] and underwent cholecystectomy and extended right hepatectomy including segments 5,6,7,8,1 and 4 in 1/2014. Pathology of the 5.1 cm mass was well differentiated HCC. He had local recurrence of [address] in 8/2015. He entered a clinical trial at [facility] and was treated with Nivolumab (Opdivo) another agent, probably Ipilimumab (Carol Presser) in 8/2015. Unclear when clinical trial stopped but I suspect this continued until 12/2016 - 1/2017. He underwent Radiotherapy, MWA and the Y-90 at [facility] in 2/2017. He has received steriotactic XR at [facility] 4/2020. The most recent imaging of the liver was MRI performed in 7/2021. Results demonstrate previous right hepatectomy. Slight decrease in size of arterial enhancing lesion in left lobe now measuring 1.4 cm. Slight decrease in size of hyperenhancing focus at resection margin, now measuring 1.0 cm. Anterior left lobe lesion measuring 2.7 cm has no enhancement and likely not HCC. Several liver cysts. Assessment of liver fibrosis with Fibroscan was performed in the office today. The result was 6.8 kPa which correlates with   stage 1 portal fibrosis    The patient has the following symptoms which could be attributed to the liver disorder:    fatigue,     The patient is not experiencing the following symptoms which are commonly seen in this liver disorder:   nausea,   vomiting,   swelling of the abdomen,   swelling of the lower extremities,     The patient completes all daily activities without any functional limitations. Next MRI 10/25. ASSESSMENT AND PLAN:  Hepatocellular carcinoma   The diagnosis was made in 11/2013 by biopsy. At diagnosis the [address] was of unclear size. Location appeared to be right lobe.       The patient does not have cirrhosis  Fibroscan in 10/2021 demonstrated  6.8 kPa and  suggesting mild stage 1 fibrosis. [address] has been treated with TACE, surgical resection, immune therapy and RFA and SRS. He has never been treated with a TKI. The most recent imaging was a Dynamic MRI performed in 7/2021. This demonstrated 2 small areas of residual enhancement in left lobe measuring 1.4 cm and at resection margin in right lobe measuring 1.0 cm. Will schedule for repeat dynamic MRI in 10/2021     Pulmonary Embolus  This occurred in 9/2013. He is on anticoagulation with Radha Ricardo. Anemia   This was due to FE deficiency. He was treated with IV FE. Anemia has resolved. The most recent HB is 13.6 gm. Treatment of other medical problems   There are no contraindications for the patient to take most medications that are necessary for treatment of other medical issues. Counseling for alcohol in patients with chronic liver disease  The patient was counseled regarding alcohol consumption and the effect of alcohol on chronic liver disease. The patient has not consumed alcohol since 11/2013. Vaccinations   The patient has received 2 doses of COVID-19 vaccine. Routine vaccinations against other bacterial and viral agents can be performed as indicated. Annual flu vaccination should be administered if indicated. ALLERGIES  Allergies   Allergen Reactions    Celebrex [Celecoxib] Rash       MEDICATIONS  Current Outpatient Medications   Medication Sig    sucralfate (Carafate) 1 gram tablet Take 1 g by mouth four (4) times daily.  omeprazole (PRILOSEC) 20 mg capsule Take 20 mg by mouth daily.  FLUTICASONE/SALMETEROL (ADVAIR HFA IN) Take  by inhalation.  DOXAZOSIN MESYLATE (CARDURA PO) Take  by mouth.  ALPRAZOLAM (XANAX PO) Take  by mouth.  MIRTAZAPINE (REMERON PO) Take  by mouth.  GLUCOSAM/GLUC RINALDI/AD-TBC-O-GLUC (GLUCOSAMINE COMPLEX PO) Take  by mouth.  MEN'S MULTI-VITAMIN PO Take  by mouth.  RIVAROXABAN (XARELTO PO) Take  by mouth. No current facility-administered medications for this visit. SYSTEM REVIEW NOT RELATED TO LIVER DISEASE OR REVIEWED ABOVE:  Constitution systems: Negative for fever, chills, weight gain, weight loss. Eyes: Negative for visual changes. ENT: Negative for sore throat, painful swallowing. Respiratory: Negative for cough, hemoptysis, SOB. Cardiology: Negative for chest pain, palpitations. GI:  Negative for constipation or diarrhea. : Negative for urinary frequency, dysuria, hematuria, nocturia. Skin: Negative for rash. Hematology: Negative for easy bruising, blood clots. Musculo-skelatal: Negative for back pain, muscle pain, weakness. Neurologic: Negative for headaches, dizziness, vertigo, memory problems not related to HE. Psychology: Negative for anxiety, depression. FAMILY HISTORY:  The father  of lung cancer. The mother  of mesothelioma. There is no family history of liver disease. SOCIAL HISTORY:  The patient is . The patient has 2 children, and 4 grandchildren. The patient stopped using tobacco products in . The patient consumes 2 scotch per night. The patient has been abstinent from alcohol since 2013. The patient retired as a [employer]. PHYSICAL EXAMINATION:  Visit Vitals  BP (!) 143/71   Pulse 70   Temp (!) 96.3 °F (35.7 °C)   Wt 190 lb 6 oz (86.4 kg)   SpO2 98%   BMI 27.32 kg/m²       General: No acute distress. Eyes: Sclera anicteric. ENT: No oral lesions. Thyroid normal.  Nodes: No adenopathy. Skin: No spider angiomata. No jaundice. No palmar erythema. Respiratory: Lungs clear to auscultation. Cardiovascular: Regular heart rate. No murmurs. No JVD. Abdomen: Soft non-tender, liver size normal to percussion/palpation. Spleen not palpable. No obvious ascites. Extremities: No edema. No muscle wasting. No gross arthritic changes. Neurologic: Alert and oriented. Cranial nerves grossly intact. No asterixis. PHYSICAL EXAMINATION:  Visit Vitals  BP (!) 143/71   Pulse 70   Temp (!) 96.3 °F (35.7 °C)   Wt 190 lb 6 oz (86.4 kg)   SpO2 98%   BMI 27.32 kg/m²     General: No acute distress. Eyes: Sclera anicteric. ENT: No oral lesions. Thyroid normal.  Nodes: No adenopathy. Skin: No spider angiomata. No jaundice. No palmar erythema. Respiratory: Lungs clear to auscultation. Cardiovascular: Regular heart rate. No murmurs. No JVD. Abdomen: Well healed surgical scars. Extremities: No edema. No muscle wasting. No gross arthritic changes. Neurologic: Alert and oriented. Cranial nerves grossly intact. No asterixis. LABORATORY STUDIES:  Liver [place] of [id number] Units 8/15/2017   WBC 4.6 - 13.2 K/uL 5.7   ANC 1.8 - 8.0 K/UL 4.0   HGB 13.0 - 16.0 g/dL 13.6    - 420 K/uL 175   AST 15 - 37 U/L 19   ALT 16 - 61 U/L 23   Alk Phos 45 - 117 U/L 52   Bili, Total 0.2 - 1.0 MG/DL 0.5   Bili, Direct 0.0 - 0.2 MG/DL 0.1   Albumin 3.4 - 5.0 g/dL 3.6   BUN 7.0 - 18 MG/DL 19 (H)   Creat 0.6 - 1.3 MG/DL 0.95   Na 136 - 145 mmol/L 148 (H)   K 3.5 - 5.5 mmol/L 4.4   Cl 100 - 108 mmol/L 110 (H)   CO2 21 - 32 mmol/L 30   Glucose 74 - 99 mg/dL 81     SEROLOGIES:  7/2016. HAV total negative, HBsAntigen negative, anti-HBcore negative, anti-HBsurface negative, anti-HCV negative, HCV RNA undetectable. Serologies Latest Ref Rng & Units 8/15/2017   Ferritin 8 - 388 NG/ML 64   Iron % Saturation % 47   Alpha-1 antitrypsin level 90 - 200 mg/dL 168     LIVER HISTOLOGY:  10/2021. FibroScan [facility]. EkPa was 6.8. IQR/med 53%. . The results suggested a fibrosis level of F1. The CAP score suggests there is no significant hepatic steatosis.     ENDOSCOPIC PROCEDURES:  Not available or performed    RADIOLOGY:  See above    OTHER TESTING:  Not available or performed    FOLLOW-UP:  All of the issues listed above in the Assessment and Plan were discussed with the patient. All questions were answered. The patient expressed a clear understanding of the above. [address] [address] in 4 weeks for Fibroscan to review all data and determine the treatment plan.       Mo Lindsey MD  [id number] [address], [address] [place] [address] - [address]  [address] [place] [address]

## 2022-03-18 PROBLEM — J45.909 ASTHMA: Status: ACTIVE | Noted: 2017-08-15

## 2022-03-18 PROBLEM — I26.99 PULMONARY EMBOLISM (HCC): Status: ACTIVE | Noted: 2021-09-12

## 2022-03-18 PROBLEM — D64.9 ANEMIA: Status: ACTIVE | Noted: 2021-08-30

## 2022-03-18 PROBLEM — I10 HYPERTENSION: Status: ACTIVE | Noted: 2017-08-15

## 2022-03-19 PROBLEM — Z77.098 AGENT ORANGE EXPOSURE: Status: ACTIVE | Noted: 2021-08-30

## 2022-03-19 PROBLEM — C22.0 HEPATOCELLULAR CARCINOMA (HCC): Status: ACTIVE | Noted: 2017-08-15

## 2022-03-20 PROBLEM — D69.6 THROMBOCYTOPENIA (HCC): Status: ACTIVE | Noted: 2021-08-30

## 2023-05-26 RX ORDER — SUCRALFATE 1 G/1
1 TABLET ORAL 4 TIMES DAILY
COMMUNITY

## 2023-05-26 RX ORDER — OMEPRAZOLE 20 MG/1
20 CAPSULE, DELAYED RELEASE ORAL DAILY
COMMUNITY